# Patient Record
Sex: MALE | URBAN - METROPOLITAN AREA
[De-identification: names, ages, dates, MRNs, and addresses within clinical notes are randomized per-mention and may not be internally consistent; named-entity substitution may affect disease eponyms.]

---

## 2021-04-20 ENCOUNTER — OTHER (OUTPATIENT)
Dept: URBAN - METROPOLITAN AREA CLINIC 18 | Facility: CLINIC | Age: 46
Setting detail: DERMATOLOGY
End: 2021-04-20

## 2021-04-20 DIAGNOSIS — C44.629 SQUAMOUS CELL CARCINOMA OF SKIN OF LEFT UPPER LIMB, INCLUDING SHOULDER: ICD-10-CM

## 2021-04-20 PROBLEM — D18.01 HEMANGIOMA OF SKIN AND SUBCUTANEOUS TISSUE: Status: RESOLVED | Noted: 2021-04-20

## 2021-04-20 PROBLEM — L82.1 OTHER SEBORRHEIC KERATOSIS: Status: RESOLVED | Noted: 2021-04-20

## 2021-04-20 PROCEDURE — 11102 TANGNTL BX SKIN SINGLE LES: CPT

## 2021-04-20 PROCEDURE — 11103 TANGNTL BX SKIN EA SEP/ADDL: CPT

## 2021-04-20 PROCEDURE — 99203 OFFICE O/P NEW LOW 30 MIN: CPT

## 2022-05-16 ENCOUNTER — OFFICE (OUTPATIENT)
Dept: URBAN - METROPOLITAN AREA CLINIC 72 | Facility: CLINIC | Age: 47
End: 2022-05-16

## 2022-05-16 VITALS
SYSTOLIC BLOOD PRESSURE: 142 MMHG | DIASTOLIC BLOOD PRESSURE: 92 MMHG | RESPIRATION RATE: 14 BRPM | HEART RATE: 73 BPM | WEIGHT: 171 LBS | HEIGHT: 73 IN

## 2022-05-16 DIAGNOSIS — F41.9 ANXIETY DISORDER, UNSPECIFIED: ICD-10-CM

## 2022-05-16 DIAGNOSIS — K21.9 GASTRO-ESOPHAGEAL REFLUX DISEASE WITHOUT ESOPHAGITIS: ICD-10-CM

## 2022-05-16 DIAGNOSIS — R07.89 OTHER CHEST PAIN: ICD-10-CM

## 2022-05-16 PROCEDURE — 99204 OFFICE O/P NEW MOD 45 MIN: CPT | Performed by: INTERNAL MEDICINE

## 2022-05-16 RX ORDER — FAMOTIDINE 40 MG/1
TABLET, FILM COATED ORAL
Qty: 90 | Refills: 3 | Status: ACTIVE
Start: 2022-05-16

## 2022-05-16 RX ORDER — HYOSCYAMINE SULFATE 0.12 MG/1
0.38 TABLET SUBLINGUAL
Qty: 30 | Refills: 3 | Status: COMPLETED
Start: 2022-05-16 | End: 2022-08-23

## 2022-05-16 RX ORDER — PANTOPRAZOLE 20 MG/1
TABLET, DELAYED RELEASE ORAL
Qty: 90 | Refills: 3 | Status: COMPLETED
Start: 2022-05-16 | End: 2022-08-23

## 2022-05-16 NOTE — SERVICENOTES
Our goal is to partner with you to improve your health and well being. It is important for you to complete necessary testing and follow the instructions given to you at your clinic visit. Our office will call you within 2 weeks with results of any testing but you may also call sooner to obtain results - [phone].   If you have any questions or concerns please feel free to call us.  We take your care very seriously and we thank you for your trust!
- richard (this will be sent to you in the mail)
- continue pantoprazole before breakfast (20 mg about 30 min before breakfast)
- meloxicam may make you more prone to acid reflux
- cutting down on caffein can help with reflux
- add on famotidine at night before bed 
- , start hyoscyamine/levsin 1 pill sublingual up to every 8 hours as needed for esophageal spasm.  If you don't think this is helping after your try it a couple times then stop
- Speak to Dr. Herndon to make sure that you don't need additional cardiac work up
- I will get records from EGD and CT scans 
- follow up in 3 months

## 2022-05-16 NOTE — SERVICEHPINOTES
Nubia Trujillo   is seen for an initial visit today.  
br
brayan
br   46 year old referred from [facility] ER for GERD
br
br  He has had reflux for 6-8 months and has been to [facility] and Dr. Stanley.  Dr. [facility]  did an EGD and was told that it was normal.  His wife has cancer and he has a very stressful job.  He wonders if symptoms are worse with stress. br He took omeprazole from Dr. [facility] and that didn't help. br
br He has been to the ER twice.  He had a Chest CT, abdomen, neck, and cardiac work up. He had EKG, labs and heart looked fine.  
br
br He was started on pantoprazole 20 mg and feels like that helped.  It has helped with burning and soreness in chest.  br
br He has a foul taste in his mouth, atycpical chest pain, regurgitation, sore throat.  br
brayan schmidt   My nurse has reviewed and updated the medication list with the patient. I have reviewed the medication list along with the documented medical, social and family history. Pertinent details are also noted above in the HPI.

## 2022-08-23 ENCOUNTER — OFFICE (OUTPATIENT)
Dept: URBAN - METROPOLITAN AREA CLINIC 72 | Facility: CLINIC | Age: 47
End: 2022-08-23

## 2022-08-23 VITALS
DIASTOLIC BLOOD PRESSURE: 80 MMHG | SYSTOLIC BLOOD PRESSURE: 128 MMHG | HEART RATE: 68 BPM | HEIGHT: 73 IN | OXYGEN SATURATION: 99 % | WEIGHT: 170 LBS

## 2022-08-23 DIAGNOSIS — Z87.891 PERSONAL HISTORY OF NICOTINE DEPENDENCE: ICD-10-CM

## 2022-08-23 DIAGNOSIS — R07.89 OTHER CHEST PAIN: ICD-10-CM

## 2022-08-23 DIAGNOSIS — R10.10 UPPER ABDOMINAL PAIN, UNSPECIFIED: ICD-10-CM

## 2022-08-23 PROCEDURE — 99214 OFFICE O/P EST MOD 30 MIN: CPT | Performed by: INTERNAL MEDICINE

## 2022-08-23 NOTE — SERVICENOTES
Our goal is to partner with you to improve your health and well being. It is important for you to complete necessary testing and follow the instructions given to you at your clinic visit. Our office will call you within 2 weeks with results of any testing but you may also call sooner to obtain results [phone].   If you have any questions or concerns please feel free to call.  We take your care very seriously and we thank you for your trust!
- do cologuard test when it arrives, if you don't receive it please let us know
- schedule US
- referral to cardiology, if you don't hear from them let us know
- take famotidine 40 mg as needed for upper abdominal pain
- take gaviscon or malox otc as needed for reflux/heartburn

## 2022-08-23 NOTE — SERVICEHPINOTES
Nubia Trujillo   is seen today for a follow-up visit.  
br
br46 year old referred from [facility] ER for GERDHe has had reflux for 6-8 months and has been to [facility] and Dr. Stanley. Dr. OSC[facility] did an EGD and was told that it was normal. His wife has cancer and he has a very stressful job. He wonders if symptoms are worse with stress. brDg took omeprazole from Dr. [facility] and that didn't help. He has been to the ER twice. He had a Chest CT, abdomen, neck, and cardiac work up. He had EKG, labs and heart looked fine. He was started on pantoprazole 20 mg and feels like that helped. It has helped with burning and soreness in chest. He has a foul taste in his mouth, atycpical chest pain, regurgitation, sore throat.    br  Plan from last visit:
br
br- cologuard (this will be sent to you in the mail)br- continue pantoprazole before breakfast (20 mg about 30 min before breakfast)br- meloxicam may make you more prone to acid refluxbr- cutting down on caffein can help with refluxbr- add on famotidine at night before bed br- , start hyoscyamine/levsin 1 pill sublingual up to every 8 hours as needed for esophageal spasm. If you don't think this is helping after your try it a couple times then stopbr- Speak to Dr. Herndon to make sure that you don't need additional cardiac work upbr- I will get records from EGD and CT scans br- follow up in 3 months Interval History:  8/23/2022   
br  br some days are better then others.  
br He is still having some pain in the upper abdomen and chest from time to time. 
br He is currently taking the pepcid at night. 
br His insurance would cover the pantorpazole.  He felt like he was able to wean off it.  
br when he eats greasy foods he has upper abdominal pain.  
br He never got the cologuard kit.  HE didn't talke to Dr. Herndon about the cardiac work up .  No family history of heart disease, he is not a smoker.  

br He tried the levsin but it cuased some side effects so he stopped. 
br
br 
I reviewed 4/22 ER visit for atypical chest pain, labs and EKG was normal br
br 
br
br My nurse has reviewed and updated the medication list with the patient (medication reconciliation). I have also reviewed the medication list. New updates were made to the patient's medical, social and family history. Pertinent details are also noted above in the HPI.br visited="true"

## 2024-04-16 ENCOUNTER — APPOINTMENT (OUTPATIENT)
Dept: URBAN - METROPOLITAN AREA SURGERY 11 | Age: 49
Setting detail: DERMATOLOGY
End: 2024-04-29

## 2024-04-16 DIAGNOSIS — L81.4 OTHER MELANIN HYPERPIGMENTATION: ICD-10-CM

## 2024-04-16 DIAGNOSIS — L82.1 OTHER SEBORRHEIC KERATOSIS: ICD-10-CM

## 2024-04-16 DIAGNOSIS — D18.0 HEMANGIOMA: ICD-10-CM

## 2024-04-16 DIAGNOSIS — D485 NEOPLASM OF UNCERTAIN BEHAVIOR OF SKIN: ICD-10-CM

## 2024-04-16 DIAGNOSIS — L57.0 ACTINIC KERATOSIS: ICD-10-CM

## 2024-04-16 DIAGNOSIS — D22 MELANOCYTIC NEVI: ICD-10-CM

## 2024-04-16 DIAGNOSIS — L82.0 INFLAMED SEBORRHEIC KERATOSIS: ICD-10-CM

## 2024-04-16 PROBLEM — D18.01 HEMANGIOMA OF SKIN AND SUBCUTANEOUS TISSUE: Status: ACTIVE | Noted: 2024-04-16

## 2024-04-16 PROBLEM — D22.5 MELANOCYTIC NEVI OF TRUNK: Status: ACTIVE | Noted: 2024-04-16

## 2024-04-16 PROBLEM — D48.5 NEOPLASM OF UNCERTAIN BEHAVIOR OF SKIN: Status: ACTIVE | Noted: 2024-04-16

## 2024-04-16 PROCEDURE — OTHER LIQUID NITROGEN: OTHER

## 2024-04-16 PROCEDURE — 11102 TANGNTL BX SKIN SINGLE LES: CPT | Mod: 59

## 2024-04-16 PROCEDURE — 99203 OFFICE O/P NEW LOW 30 MIN: CPT | Mod: 25

## 2024-04-16 PROCEDURE — OTHER BIOPSY BY SHAVE METHOD: OTHER

## 2024-04-16 PROCEDURE — 17110 DESTRUCT B9 LESION 1-14: CPT

## 2024-04-16 PROCEDURE — OTHER COUNSELING: OTHER

## 2024-04-16 PROCEDURE — 17000 DESTRUCT PREMALG LESION: CPT | Mod: 59

## 2024-04-16 ASSESSMENT — LOCATION ZONE DERM
LOCATION ZONE: FACE
LOCATION ZONE: HAND
LOCATION ZONE: TRUNK

## 2024-04-16 ASSESSMENT — LOCATION SIMPLE DESCRIPTION DERM
LOCATION SIMPLE: CHEST
LOCATION SIMPLE: LEFT CHEEK
LOCATION SIMPLE: LEFT HAND

## 2024-04-16 ASSESSMENT — LOCATION DETAILED DESCRIPTION DERM
LOCATION DETAILED: MIDDLE STERNUM
LOCATION DETAILED: RIGHT LATERAL SUPERIOR CHEST
LOCATION DETAILED: LEFT RADIAL DORSAL HAND
LOCATION DETAILED: LEFT MEDIAL SUPERIOR CHEST
LOCATION DETAILED: RIGHT MEDIAL SUPERIOR CHEST
LOCATION DETAILED: LEFT CENTRAL MALAR CHEEK
LOCATION DETAILED: LEFT CENTRAL BUCCAL CHEEK

## 2024-04-16 NOTE — PROCEDURE: BIOPSY BY SHAVE METHOD
Detail Level: Detailed
Depth Of Biopsy: dermis
Was A Bandage Applied: Yes
Size Of Lesion In Cm: 0
Biopsy Type: H and E
Biopsy Method: Dermablade
Anesthesia Type: 1% lidocaine with epinephrine
Anesthesia Volume In Cc: 0.5
Hemostasis: Drysol
Wound Care: Petrolatum
Dressing: bandage
Destruction After The Procedure: No
Type Of Destruction Used: Curettage
Curettage Text: The wound bed was treated with curettage after the biopsy was performed.
Cryotherapy Text: The wound bed was treated with cryotherapy after the biopsy was performed.
Electrodesiccation Text: The wound bed was treated with electrodesiccation after the biopsy was performed.
Electrodesiccation And Curettage Text: The wound bed was treated with electrodesiccation and curettage after the biopsy was performed.
Silver Nitrate Text: The wound bed was treated with silver nitrate after the biopsy was performed.
Lab: [id number]
Lab Facility: [id number]
Consent: Written consent was obtained and risks were reviewed including but not limited to scarring, infection, bleeding, scabbing, incomplete removal, nerve damage and allergy to anesthesia.
Post-Care Instructions: I reviewed with the patient in detail post-care instructions. Patient is to keep the biopsy site dry overnight, and then apply bacitracin twice daily until healed. Patient may apply hydrogen peroxide soaks to remove any crusting.
Notification Instructions: Patient will be notified of biopsy results. However, patient instructed to call the office if not contacted within 2 weeks.
Billing Type: Third-Party Bill
Information: Selecting Yes will display possible errors in your note based on the variables you have selected. This validation is only offered as a suggestion for you. PLEASE NOTE THAT THE VALIDATION TEXT WILL BE REMOVED WHEN YOU FINALIZE YOUR NOTE. IF YOU WANT TO FAX A PRELIMINARY NOTE YOU WILL NEED TO TOGGLE THIS TO 'NO' IF YOU DO NOT WANT IT IN YOUR FAXED NOTE.

## 2024-04-16 NOTE — PROCEDURE: LIQUID NITROGEN
Medical Necessity Information: It is in your best interest to select a reason for this procedure from the list below. All of these items fulfill various CMS LCD requirements except the new and changing color options.
Medical Necessity Clause: This procedure was medically necessary because the lesions that were treated were:
Show Topical Anesthesia Variable?: Yes
Consent: The patient's consent was obtained including but not limited to risks of crusting, scabbing, blistering, scarring, darker or lighter pigmentary change, recurrence, incomplete removal and infection.
Detail Level: Detailed
Post-Care Instructions: I reviewed with the patient in detail post-care instructions. Patient is to wear sunprotection, and avoid picking at any of the treated lesions. Pt may apply Vaseline to crusted or scabbing areas.
Spray Paint Technique: No
Number Of Freeze-Thaw Cycles: 2 freeze-thaw cycles
Spray Paint Text: The liquid nitrogen was applied to the skin utilizing a spray paint frosting technique.
Number Of Freeze-Thaw Cycles: 1 freeze-thaw cycle
Duration Of Freeze Thaw-Cycle (Seconds): 0

## 2024-04-16 NOTE — PROCEDURE: COUNSELING
Detail Level: Detailed
Detail Level: Zone
Detail Level: Generalized
Patient Specific Counseling (Will Not Stick From Patient To Patient): spot of concern back